# Patient Record
Sex: FEMALE | Race: WHITE | NOT HISPANIC OR LATINO | URBAN - METROPOLITAN AREA
[De-identification: names, ages, dates, MRNs, and addresses within clinical notes are randomized per-mention and may not be internally consistent; named-entity substitution may affect disease eponyms.]

---

## 2022-09-22 ENCOUNTER — TELEPHONE (OUTPATIENT)
Dept: AUDIOLOGY | Facility: CLINIC | Age: 52
End: 2022-09-22

## 2022-09-22 ENCOUNTER — TRANSCRIBE ORDERS (OUTPATIENT)
Dept: OTHER | Age: 52
End: 2022-09-22

## 2022-09-22 DIAGNOSIS — Z48.89 COCHLEAR IMPLANT FOLLOW-UP: Primary | ICD-10-CM

## 2022-09-22 DIAGNOSIS — Z96.21 COCHLEAR IMPLANT FOLLOW-UP: Primary | ICD-10-CM

## 2022-09-22 NOTE — TELEPHONE ENCOUNTER
[facility] Call Center    Phone Message    May a detailed message be left on voicemail: yes     Reason for Call: Appointment Intake    Referring Provider Name: Dr Kristina Norman @ [facility]  Diagnosis and/or Symptoms: Cochlear implant follow-up     Pt states that she had her surgery at [facility] in 2017 by Dr Donta Fuentes. She is wanting to be seen for discoloration of the skin under where the magnet is for her cochlear implant. Pt said she has attempted multiple times to reach out to Dr Fuentes and has not been answered. She has also reached out to the  of the device who stated her magnet may be too strong but later confirmed she has the weakest magnet strength. Says she is unhappy with her care with Dr Fuentes and would like to be seen by a provider at the [facility].    Action Taken: Message routed to:  [facility] ([facility]): Audiology    Travel Screening: Not Applicable

## 2022-09-22 NOTE — TELEPHONE ENCOUNTER
Writer spoke to patient and another person and explained that we are not accepting transfer of care patients at this time for cochlear implants if they have gotten their surgery in [place]. We do not have the capacity to see outside patients along with our own patients at this time. Writer suggested they reach out to their referring provider at [facility] and see if they can reach out to their surgeon at [place] to explain what is going on and see if they can get in at [place]. No further questions.

## 2022-09-26 ENCOUNTER — TELEPHONE (OUTPATIENT)
Dept: AUDIOLOGY | Facility: CLINIC | Age: 52
End: 2022-09-26

## 2022-09-26 NOTE — TELEPHONE ENCOUNTER
[facility] Call Center    Phone Message    May a detailed message be left on voicemail: yes     Reason for Call: Other:   Pt following up. Pt reiterates that she wants to come to the [place] because we are closer and she plans to use the  for her other care.    Pt does not want to go to [place] because she can no longer continue to arrange transportation to [place] because it's too far and pt is diabled. Please follow-up with pt to advise if clinic can make exception. Ok to leave .     Action Taken: Other:  AUD    Travel Screening: Not Applicable

## 2022-09-27 NOTE — TELEPHONE ENCOUNTER
Explained to the pt that we are not accepting new pts for CI. Encouraged her reach out to [place] again or call the back of her insurance to find out available who sees for CI in audiology [place] cities. No further questions.    Babs Sancehz LPN